# Patient Record
Sex: MALE | Race: BLACK OR AFRICAN AMERICAN | Employment: FULL TIME
[De-identification: names, ages, dates, MRNs, and addresses within clinical notes are randomized per-mention and may not be internally consistent; named-entity substitution may affect disease eponyms.]

---

## 2024-04-30 RX ORDER — LIDOCAINE HYDROCHLORIDE 10 MG/ML
10 INJECTION, SOLUTION INFILTRATION; PERINEURAL ONCE
Status: COMPLETED | OUTPATIENT
Start: 2024-05-03 | End: 2024-05-03

## 2024-05-03 ENCOUNTER — HOSPITAL ENCOUNTER (OUTPATIENT)
Facility: HOSPITAL | Age: 44
End: 2024-05-03
Payer: COMMERCIAL

## 2024-05-03 DIAGNOSIS — R97.20 ELEVATED PSA: ICD-10-CM

## 2024-05-03 PROCEDURE — 88305 TISSUE EXAM BY PATHOLOGIST: CPT

## 2024-05-03 PROCEDURE — 76942 ECHO GUIDE FOR BIOPSY: CPT

## 2024-05-03 PROCEDURE — 2500000003 HC RX 250 WO HCPCS: Performed by: UROLOGY

## 2024-05-03 PROCEDURE — 55700 US BIOPSY PROSTATE NEEDLE/PUNCH: CPT | Performed by: UROLOGY

## 2024-05-03 RX ADMIN — LIDOCAINE HYDROCHLORIDE ANHYDROUS 10 ML: 10 INJECTION, SOLUTION INFILTRATION at 14:11

## 2024-05-03 NOTE — PROCEDURES
PROCEDURE NOTE  Date: 5/3/2024   Name: Scott Medrano  YOB: 1980    Elevated PSA       Date of Procedure: 5/3/2024     Pre-Op Diagnosis: Elevated PSA     Post-Op Diagnosis: Same as preoperative diagnosis.       Procedure:  Transrectal US, Prostate biopsy     Surgical Assistant: None     Anesthesia: Local -- 10ml 1% lidocaine     Estimated Blood Loss (mL): Minimal     Complications: None     Specimens:   Prostate biopsies:  Left apex, left middle, left base  Right apex, right middle, right base     Implants: * No surgical log found *     Drains: * No LDAs found *     Findings: 46.16 ml volume,  no hypoechoic lesions        Detailed Description of Procedure:      After informed consent was obtained, the patient was taken to the ultrasound room and placed in the dorsal lithotomy position.       A transrectal ultrasound probe was then inserted transanally without difficulty into the rectum.  Ultrasound pictures were taken of the prostate in transverse and longitudinal views.  The prostate was measured using the standard Eliptoid formula and was found to be 46.16 mL in size.  The ultrasound views were obtained of the seminal vesicles, which were normal; the transition zone, which was normal; the right periprosthetic tissue, which was normal; and the left periprosthetic tissue, which was normal.  The left apex, mid and base, showed no hypoechoic lesions or areas of abnormality.  10ml of 1% lidocaine without epinephrine were used to infiltrate the periprostatic tissue.        At this point, using ultrasound guidance, biopsies were taken. At the left base, 2 biopsies were taken. At the left mid 2 biopsies were taken. At the left apex 2 were taken.  Then, 2 biopsies were taken at the right base, 2 biopsies were taken at the right mid, and 2 biopsies taken at the right apex.  At this point, the ultrasound probe was removed.  There was no active bleeding.  The patient was taken out of lithotomy position and walked